# Patient Record
Sex: MALE | URBAN - METROPOLITAN AREA
[De-identification: names, ages, dates, MRNs, and addresses within clinical notes are randomized per-mention and may not be internally consistent; named-entity substitution may affect disease eponyms.]

---

## 2021-02-02 ENCOUNTER — NEW PATIENT (OUTPATIENT)
Dept: URBAN - METROPOLITAN AREA CLINIC 24 | Facility: CLINIC | Age: 66
End: 2021-02-02
Payer: MEDICARE

## 2021-02-02 DIAGNOSIS — H43.813 VITREOUS DEGENERATION, BILATERAL: ICD-10-CM

## 2021-02-02 PROCEDURE — 99204 OFFICE O/P NEW MOD 45 MIN: CPT | Performed by: OPTOMETRIST

## 2021-02-02 PROCEDURE — 92134 CPTRZ OPH DX IMG PST SGM RTA: CPT | Performed by: OPTOMETRIST

## 2021-02-02 ASSESSMENT — INTRAOCULAR PRESSURE
OD: 11
OS: 14

## 2021-02-02 ASSESSMENT — VISUAL ACUITY
OD: 20/20
OS: 20/30

## 2021-02-02 ASSESSMENT — KERATOMETRY
OD: 44.30
OS: 44.55

## 2021-03-02 ENCOUNTER — Encounter (OUTPATIENT)
Dept: URBAN - METROPOLITAN AREA CLINIC 30 | Facility: CLINIC | Age: 66
End: 2021-03-02
Payer: MEDICARE

## 2021-03-02 DIAGNOSIS — H25.12 AGE-RELATED NUCLEAR CATARACT, LEFT EYE: Primary | ICD-10-CM

## 2021-03-02 DIAGNOSIS — H25.813 COMBINED FORMS OF AGE-RELATED CATARACT, BILATERAL: ICD-10-CM

## 2021-03-02 DIAGNOSIS — Z01.818 ENCOUNTER FOR OTHER PREPROCEDURAL EXAMINATION: Primary | ICD-10-CM

## 2021-03-02 PROCEDURE — 92025 CPTRIZED CORNEAL TOPOGRAPHY: CPT | Performed by: OPHTHALMOLOGY

## 2021-03-02 PROCEDURE — 99203 OFFICE O/P NEW LOW 30 MIN: CPT | Performed by: PHYSICIAN ASSISTANT

## 2021-03-03 ENCOUNTER — NEW PATIENT (OUTPATIENT)
Dept: URBAN - METROPOLITAN AREA CLINIC 24 | Facility: CLINIC | Age: 66
End: 2021-03-03
Payer: MEDICARE

## 2021-03-03 DIAGNOSIS — H25.811 COMBINED FORMS OF AGE-RELATED CATARACT, RIGHT EYE: ICD-10-CM

## 2021-03-03 PROCEDURE — 99203 OFFICE O/P NEW LOW 30 MIN: CPT | Performed by: OPHTHALMOLOGY

## 2021-03-03 ASSESSMENT — INTRAOCULAR PRESSURE
OD: 16
OS: 16

## 2021-03-23 ENCOUNTER — SURGERY (OUTPATIENT)
Dept: URBAN - METROPOLITAN AREA SURGERY 12 | Facility: SURGERY | Age: 66
End: 2021-03-23
Payer: MEDICARE

## 2021-03-23 PROCEDURE — 66984 XCAPSL CTRC RMVL W/O ECP: CPT | Performed by: OPHTHALMOLOGY

## 2021-03-24 ENCOUNTER — POST OP (OUTPATIENT)
Dept: URBAN - METROPOLITAN AREA CLINIC 24 | Facility: CLINIC | Age: 66
End: 2021-03-24
Payer: MEDICARE

## 2021-03-24 PROCEDURE — 99024 POSTOP FOLLOW-UP VISIT: CPT | Performed by: OPTOMETRIST

## 2021-03-24 ASSESSMENT — INTRAOCULAR PRESSURE: OS: 13

## 2021-03-30 ENCOUNTER — POST OP (OUTPATIENT)
Dept: URBAN - METROPOLITAN AREA CLINIC 24 | Facility: CLINIC | Age: 66
End: 2021-03-30
Payer: MEDICARE

## 2021-03-30 DIAGNOSIS — Z96.1 PRESENCE OF INTRAOCULAR LENS: Primary | ICD-10-CM

## 2021-03-30 PROCEDURE — 99024 POSTOP FOLLOW-UP VISIT: CPT | Performed by: OPTOMETRIST

## 2021-03-30 ASSESSMENT — VISUAL ACUITY
OD: 20/20
OS: 20/20

## 2021-03-30 ASSESSMENT — INTRAOCULAR PRESSURE
OS: 14
OD: 11

## 2021-04-14 ENCOUNTER — POST-OPERATIVE VISIT (OUTPATIENT)
Dept: URBAN - METROPOLITAN AREA CLINIC 24 | Facility: CLINIC | Age: 66
End: 2021-04-14
Payer: MEDICARE

## 2021-04-14 PROCEDURE — 99024 POSTOP FOLLOW-UP VISIT: CPT | Performed by: OPHTHALMOLOGY

## 2021-04-14 RX ORDER — PREDNISOLONE/GATIFLOX/NEPAFEN 1-0.5-0.1%
1 % SUSPENSION, DROPS(FINAL DOSAGE FORM)(ML) OPHTHALMIC (EYE)
Qty: 5 | Refills: 0 | Status: INACTIVE
Start: 2021-04-14 | End: 2021-11-02

## 2021-04-14 ASSESSMENT — VISUAL ACUITY: OS: 20/20

## 2021-04-14 ASSESSMENT — INTRAOCULAR PRESSURE: OS: 17

## 2021-04-14 NOTE — IMPRESSION/PLAN
Impression: S/P CE/Standard IOL OS - 22 Days. Encounter for surgical aftercare following surgery on a sense organ  Z48.810. Excellent post op course  
-some papillae under left upper lid- Plan: recommend stopping eysuvis and starting prednisolone tid os and using tears.

## 2021-04-30 ENCOUNTER — POST-OPERATIVE VISIT (OUTPATIENT)
Dept: URBAN - METROPOLITAN AREA CLINIC 24 | Facility: CLINIC | Age: 66
End: 2021-04-30
Payer: MEDICARE

## 2021-04-30 PROCEDURE — 99024 POSTOP FOLLOW-UP VISIT: CPT | Performed by: OPTOMETRIST

## 2021-04-30 ASSESSMENT — INTRAOCULAR PRESSURE
OD: 16
OS: 15

## 2021-04-30 ASSESSMENT — VISUAL ACUITY
OS: 20/20
OD: 20/20

## 2021-04-30 NOTE — IMPRESSION/PLAN
Impression: S/P Cataract Extraction by phacoemulsification with IOL placement, ORA OS - 38 Days. Encounter for surgical aftercare following surgery on a sense organ  Z48.470. Plan: Patient reports symptoms improving and wishes to proceed with cat sx OD.

## 2021-11-02 ENCOUNTER — POST-OPERATIVE VISIT (OUTPATIENT)
Dept: URBAN - METROPOLITAN AREA CLINIC 30 | Facility: CLINIC | Age: 66
End: 2021-11-02
Payer: MEDICARE

## 2021-11-02 DIAGNOSIS — Z48.810 ENCOUNTER FOR SURGICAL AFTERCARE FOLLOWING SURGERY ON A SENSE ORGAN: Primary | ICD-10-CM

## 2021-11-02 PROCEDURE — 99024 POSTOP FOLLOW-UP VISIT: CPT | Performed by: OPTOMETRIST

## 2021-11-02 RX ORDER — PREDNISOLONE ACETATE 10 MG/ML
1 % SUSPENSION/ DROPS OPHTHALMIC
Qty: 5 | Refills: 0 | Status: ACTIVE
Start: 2021-11-02

## 2021-11-02 RX ORDER — PREDNISOLONE/GATIFLOX/NEPAFEN 1-0.5-0.1%
1 % SUSPENSION, DROPS(FINAL DOSAGE FORM)(ML) OPHTHALMIC (EYE)
Qty: 5 | Refills: 0 | Status: ACTIVE
Start: 2021-11-02

## 2021-11-02 ASSESSMENT — VISUAL ACUITY
OS: 20/40
OD: 20/40

## 2021-11-02 ASSESSMENT — KERATOMETRY
OD: 44.18
OS: 44.24

## 2021-11-02 ASSESSMENT — INTRAOCULAR PRESSURE
OS: 16
OD: 17

## 2021-11-02 NOTE — IMPRESSION/PLAN
Impression: S/P Cataract Extraction by phacoemulsification with IOL placement, ORA OS - 224 Days. Encounter for surgical aftercare following surgery on a sense organ  Z48.810. Post operative instructions reviewed - Plan: Pt notes irritation, burning, tearing and fbs temporally OS after surgery. Pt previously used Eysuvis and PA 1%, did not notice any improvement. Notes central VA is doing well but struggles w peripheral VA. Pt ed on dry eyes. Restart AT's QID, gave samples today. Restart PA 1% BID. Avoid ceiling fans. +sleep mask. Start Gel gtts QHS. Consider punctal plug LLL next visit. RTC 6 weeks PO.